# Patient Record
Sex: FEMALE | Race: WHITE | NOT HISPANIC OR LATINO | Employment: OTHER | URBAN - METROPOLITAN AREA
[De-identification: names, ages, dates, MRNs, and addresses within clinical notes are randomized per-mention and may not be internally consistent; named-entity substitution may affect disease eponyms.]

---

## 2017-06-16 ENCOUNTER — OFFICE VISIT (OUTPATIENT)
Dept: OPHTHALMOLOGY | Facility: CLINIC | Age: 82
End: 2017-06-16
Payer: MEDICARE

## 2017-06-16 DIAGNOSIS — H35.341 MACULAR HOLE OF RIGHT EYE: Primary | ICD-10-CM

## 2017-06-16 PROCEDURE — 92134 CPTRZ OPH DX IMG PST SGM RTA: CPT | Mod: S$GLB,,, | Performed by: OPHTHALMOLOGY

## 2017-06-16 PROCEDURE — 99999 PR PBB SHADOW E&M-EST. PATIENT-LVL II: CPT | Mod: PBBFAC,,, | Performed by: OPHTHALMOLOGY

## 2017-06-16 PROCEDURE — 92014 COMPRE OPH EXAM EST PT 1/>: CPT | Mod: S$GLB,,, | Performed by: OPHTHALMOLOGY

## 2017-06-16 RX ORDER — DONEPEZIL HYDROCHLORIDE 10 MG/1
10 TABLET, FILM COATED ORAL
COMMUNITY
Start: 2017-01-25 | End: 2018-07-23

## 2017-06-16 RX ORDER — AMOXICILLIN AND CLAVULANATE POTASSIUM 500; 125 MG/1; MG/1
TABLET, FILM COATED ORAL
COMMUNITY
Start: 2017-04-13 | End: 2018-12-03

## 2017-06-16 RX ORDER — MULTIVITAMIN
1 TABLET ORAL
COMMUNITY

## 2017-06-16 RX ORDER — CHOLECALCIFEROL (VITAMIN D3) 25 MCG
185 TABLET ORAL
COMMUNITY
End: 2018-07-23

## 2017-06-16 RX ORDER — GABAPENTIN 300 MG/1
CAPSULE ORAL
COMMUNITY
Start: 2017-06-09

## 2017-06-16 RX ORDER — LEVOTHYROXINE SODIUM 75 UG/1
75 TABLET ORAL
COMMUNITY
Start: 2017-01-06 | End: 2018-07-23

## 2017-06-16 RX ORDER — SERTRALINE HYDROCHLORIDE 50 MG/1
50 TABLET, FILM COATED ORAL
COMMUNITY
Start: 2017-01-06 | End: 2018-07-23

## 2017-06-16 RX ORDER — ATORVASTATIN CALCIUM 20 MG/1
20 TABLET, FILM COATED ORAL
COMMUNITY
Start: 2017-01-06 | End: 2018-07-23

## 2017-06-16 RX ORDER — MELOXICAM 15 MG/1
15 TABLET ORAL
COMMUNITY
Start: 2017-01-06 | End: 2018-07-23

## 2017-06-16 NOTE — PROGRESS NOTES
===============================  06/16/2017   Carol Zaragoza,   86 y.o. female   Last visit [facility]: :8/22/2016   Last visit eye dept. Visit date not found  [facility]:  Uncorrected distance visual acuity was 20/400 in the right eye and 20/30 in the left eye.  Tonometry     Tonometry (Applanation, 10:00 AM)       Right Left    Pressure 16 17               Not recorded         Not recorded        Chief Complaint   Patient presents with    MACULAR HOLE     10 MONTHS  MACULAR HOLE OD        HPI     MACULAR HOLE    Additional comments: 10 MONTHS  MACULAR HOLE OD           Comments   Referred by [facility]  MAC HOLE OD  DRY EYES  PCIOL OS +23.5 SN60WF 10-19-16  PCIOL OD +23.5 SN60WF 11/9/16  S/P AVASTIN 3/3/10 OD  S/p avastin od no improvement    AT's PRN OU       Last edited by Ruba Machado on 6/16/2017  9:46 AM. (History)      Read Studies:   Vitals  ________________  6/16/2017  Problem List Items Addressed This Visit        Eye/Vision problems    Macular hole of right eye - Primary    Relevant Orders    Posterior Segment OCT Retina-Both eyes      Other Visit Diagnoses    None.       .  .od MH  erm os   Recent CE  happuy she did CE   iop T 16 16  rprc 6 mos         ===========================

## 2018-07-23 ENCOUNTER — OFFICE VISIT (OUTPATIENT)
Dept: OPHTHALMOLOGY | Facility: CLINIC | Age: 83
End: 2018-07-23
Payer: MEDICARE

## 2018-07-23 DIAGNOSIS — H35.341 MACULAR HOLE OF RIGHT EYE: Primary | ICD-10-CM

## 2018-07-23 PROCEDURE — 99999 PR PBB SHADOW E&M-EST. PATIENT-LVL II: CPT | Mod: PBBFAC,,, | Performed by: OPHTHALMOLOGY

## 2018-07-23 PROCEDURE — 92134 CPTRZ OPH DX IMG PST SGM RTA: CPT | Mod: S$GLB,,, | Performed by: OPHTHALMOLOGY

## 2018-07-23 PROCEDURE — 92014 COMPRE OPH EXAM EST PT 1/>: CPT | Mod: S$GLB,,, | Performed by: OPHTHALMOLOGY

## 2018-07-23 RX ORDER — SERTRALINE HYDROCHLORIDE 50 MG/1
50 TABLET, FILM COATED ORAL
COMMUNITY
Start: 2018-03-16 | End: 2018-12-03

## 2018-07-23 RX ORDER — LEVOTHYROXINE SODIUM 88 UG/1
TABLET ORAL
COMMUNITY
Start: 2018-03-26

## 2018-07-23 RX ORDER — CHOLECALCIFEROL (VITAMIN D3) 25 MCG
TABLET ORAL
COMMUNITY

## 2018-07-23 RX ORDER — ATORVASTATIN CALCIUM 20 MG/1
20 TABLET, FILM COATED ORAL
COMMUNITY

## 2018-07-23 RX ORDER — OMEPRAZOLE 20 MG/1
TABLET, DELAYED RELEASE ORAL
COMMUNITY

## 2018-07-23 RX ORDER — DONEPEZIL HYDROCHLORIDE 10 MG/1
TABLET, FILM COATED ORAL
COMMUNITY
Start: 2018-03-26

## 2018-07-23 RX ORDER — TRIAMCINOLONE ACETONIDE 5 MG/G
CREAM TOPICAL
COMMUNITY
Start: 2018-07-09 | End: 2019-07-09

## 2018-07-23 RX ORDER — MELOXICAM 15 MG/1
15 TABLET ORAL
COMMUNITY
Start: 2017-06-27

## 2018-07-23 RX ORDER — OXYBUTYNIN CHLORIDE 5 MG/1
TABLET ORAL
COMMUNITY
Start: 2017-10-27

## 2018-07-23 NOTE — PROGRESS NOTES
===============================  07/23/2018   Carol Zaragoza,   87 y.o. female   Last visit [facility]: :6/16/2017   Last visit eye dept. Visit date not found  [facility]:  Uncorrected distance visual acuity was 20/400 in the right eye and 20/40 +1 in the left eye.  Tonometry     Tonometry (Applanation, 2:51 PM)       Right Left    Pressure 9 10               Not recorded         Not recorded        Chief Complaint   Patient presents with    macular hole  od     yearly exam        HPI     macular hole  od    Additional comments: yearly exam           Comments   Referred by [facility]  MAC HOLE OD  DRY EYES  PCIOL OS +23.5 SN60WF 10-19-16  PCIOL OD +23.5 SN60WF 11/9/16  S/P AVASTIN 3/3/10 OD  S/p avastin od no improvement    AT's PRN OU       Last edited by Ruba Machado, Patient Care Assistant on 7/23/2018    2:39 PM. (History)          ________________  7/23/2018  Problem List Items Addressed This Visit        Eye/Vision problems    Macular hole - Primary    Relevant Orders    Posterior Segment OCT Retina-Both eyes        pcoil ou  od   rtc 1 year    .       ===========================

## 2018-12-03 ENCOUNTER — OFFICE VISIT (OUTPATIENT)
Dept: OPHTHALMOLOGY | Facility: CLINIC | Age: 83
End: 2018-12-03
Payer: MEDICARE

## 2018-12-03 DIAGNOSIS — H43.812 POSTERIOR VITREOUS DETACHMENT OF LEFT EYE: Primary | ICD-10-CM

## 2018-12-03 PROCEDURE — 99999 PR PBB SHADOW E&M-EST. PATIENT-LVL II: CPT | Mod: PBBFAC,,, | Performed by: OPHTHALMOLOGY

## 2018-12-03 PROCEDURE — 92012 INTRM OPH EXAM EST PATIENT: CPT | Mod: S$GLB,,, | Performed by: OPHTHALMOLOGY

## 2018-12-03 RX ORDER — AMOXICILLIN AND CLAVULANATE POTASSIUM 500; 125 MG/1; MG/1
1 TABLET, FILM COATED ORAL
COMMUNITY
End: 2018-12-03

## 2018-12-03 RX ORDER — MEMANTINE HYDROCHLORIDE 10 MG/1
TABLET ORAL
COMMUNITY
Start: 2018-10-15

## 2018-12-03 NOTE — PROGRESS NOTES
===============================  12/03/2018   Carol Zaragoza,   87 y.o. female   Last visit [facility]: :7/23/2018   Last visit eye dept. 7/23/2018  [facility]:  Uncorrected distance visual acuity was CF at 3' in the right eye and 20/40 in the left eye.  Tonometry     Tonometry (Applanation, 3:31 PM)       Right Left    Pressure 6 9               Not recorded         Not recorded        Chief Complaint   Patient presents with    Spots and/or Floaters     pt states that she is seeing some flashes of light/ off/on for about 2 to 4 weeks now        HPI     Spots and/or Floaters      Additional comments: pt states that she is seeing some flashes of light/   off/on for about 2 to 4 weeks now              Comments     Referred by [facility]  MAC HOLE OD  DRY EYES  PCIOL OS +23.5 SN60WF 10-19-16  PCIOL OD +23.5 SN60WF 11/9/16  S/P AVASTIN 3/3/10 OD  S/p avastin od no improvement    AT's PRN OU          Last edited by Ruba Machado on 12/3/2018  3:24 PM. 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       ===========================